# Patient Record
Sex: FEMALE | Race: WHITE | Employment: PART TIME | URBAN - METROPOLITAN AREA
[De-identification: names, ages, dates, MRNs, and addresses within clinical notes are randomized per-mention and may not be internally consistent; named-entity substitution may affect disease eponyms.]

---

## 2017-03-08 ENCOUNTER — OFFICE VISIT (OUTPATIENT)
Dept: PULMONOLOGY | Age: 67
End: 2017-03-08

## 2017-03-08 VITALS
WEIGHT: 171 LBS | HEART RATE: 70 BPM | SYSTOLIC BLOOD PRESSURE: 108 MMHG | OXYGEN SATURATION: 95 % | DIASTOLIC BLOOD PRESSURE: 67 MMHG | BODY MASS INDEX: 29.35 KG/M2

## 2017-03-08 DIAGNOSIS — J98.4 RESTRICTIVE AIRWAY DISEASE: ICD-10-CM

## 2017-03-08 DIAGNOSIS — J44.9 COPD, MILD (HCC): Primary | ICD-10-CM

## 2017-03-08 DIAGNOSIS — R07.89 OTHER CHEST PAIN: ICD-10-CM

## 2017-03-08 DIAGNOSIS — R06.02 SOB (SHORTNESS OF BREATH): ICD-10-CM

## 2017-03-08 PROCEDURE — G8926 SPIRO NO PERF OR DOC: HCPCS | Performed by: INTERNAL MEDICINE

## 2017-03-08 PROCEDURE — 4040F PNEUMOC VAC/ADMIN/RCVD: CPT | Performed by: INTERNAL MEDICINE

## 2017-03-08 PROCEDURE — G8420 CALC BMI NORM PARAMETERS: HCPCS | Performed by: INTERNAL MEDICINE

## 2017-03-08 PROCEDURE — G8484 FLU IMMUNIZE NO ADMIN: HCPCS | Performed by: INTERNAL MEDICINE

## 2017-03-08 PROCEDURE — 1090F PRES/ABSN URINE INCON ASSESS: CPT | Performed by: INTERNAL MEDICINE

## 2017-03-08 PROCEDURE — G8427 DOCREV CUR MEDS BY ELIG CLIN: HCPCS | Performed by: INTERNAL MEDICINE

## 2017-03-08 PROCEDURE — G8400 PT W/DXA NO RESULTS DOC: HCPCS | Performed by: INTERNAL MEDICINE

## 2017-03-08 PROCEDURE — 1123F ACP DISCUSS/DSCN MKR DOCD: CPT | Performed by: INTERNAL MEDICINE

## 2017-03-08 PROCEDURE — 3023F SPIROM DOC REV: CPT | Performed by: INTERNAL MEDICINE

## 2017-03-08 PROCEDURE — 3014F SCREEN MAMMO DOC REV: CPT | Performed by: INTERNAL MEDICINE

## 2017-03-08 PROCEDURE — 99214 OFFICE O/P EST MOD 30 MIN: CPT | Performed by: INTERNAL MEDICINE

## 2017-03-08 PROCEDURE — 1036F TOBACCO NON-USER: CPT | Performed by: INTERNAL MEDICINE

## 2017-03-08 PROCEDURE — 3017F COLORECTAL CA SCREEN DOC REV: CPT | Performed by: INTERNAL MEDICINE

## 2017-03-08 RX ORDER — ATORVASTATIN CALCIUM 20 MG/1
1 TABLET, FILM COATED ORAL DAILY
COMMUNITY
Start: 2017-03-01

## 2017-03-08 RX ORDER — ALBUTEROL SULFATE 90 UG/1
2 AEROSOL, METERED RESPIRATORY (INHALATION) EVERY 6 HOURS PRN
Qty: 1 INHALER | Refills: 11 | Status: SHIPPED | OUTPATIENT
Start: 2017-03-08 | End: 2017-10-11 | Stop reason: SDUPTHER

## 2017-03-14 ENCOUNTER — HOSPITAL ENCOUNTER (OUTPATIENT)
Dept: PULMONOLOGY | Age: 67
Discharge: HOME | End: 2017-03-14
Attending: INTERNAL MEDICINE | Admitting: INTERNAL MEDICINE

## 2017-03-14 VITALS — OXYGEN SATURATION: 97 % | HEART RATE: 61 BPM

## 2017-03-14 DIAGNOSIS — J44.9 CHRONIC OBSTRUCTIVE PULMONARY DISEASE (HCC): ICD-10-CM

## 2017-03-14 RX ORDER — ALBUTEROL SULFATE 90 UG/1
4 AEROSOL, METERED RESPIRATORY (INHALATION) ONCE
Status: COMPLETED | OUTPATIENT
Start: 2017-03-14 | End: 2017-03-14

## 2017-03-14 RX ADMIN — ALBUTEROL SULFATE 4 PUFF: 90 AEROSOL, METERED RESPIRATORY (INHALATION) at 17:24

## 2017-03-20 ENCOUNTER — TELEPHONE (OUTPATIENT)
Dept: PULMONOLOGY | Age: 67
End: 2017-03-20

## 2017-04-10 ENCOUNTER — OFFICE VISIT (OUTPATIENT)
Dept: PULMONOLOGY | Age: 67
End: 2017-04-10

## 2017-04-10 VITALS — SYSTOLIC BLOOD PRESSURE: 117 MMHG | DIASTOLIC BLOOD PRESSURE: 67 MMHG | HEART RATE: 78 BPM

## 2017-04-10 DIAGNOSIS — I10 ESSENTIAL HYPERTENSION: ICD-10-CM

## 2017-04-10 DIAGNOSIS — G47.33 OSA (OBSTRUCTIVE SLEEP APNEA): ICD-10-CM

## 2017-04-10 DIAGNOSIS — R06.02 SOB (SHORTNESS OF BREATH): Primary | ICD-10-CM

## 2017-04-10 DIAGNOSIS — J44.9 COPD, MODERATE (HCC): ICD-10-CM

## 2017-04-10 PROCEDURE — G8926 SPIRO NO PERF OR DOC: HCPCS | Performed by: INTERNAL MEDICINE

## 2017-04-10 PROCEDURE — 1036F TOBACCO NON-USER: CPT | Performed by: INTERNAL MEDICINE

## 2017-04-10 PROCEDURE — 3017F COLORECTAL CA SCREEN DOC REV: CPT | Performed by: INTERNAL MEDICINE

## 2017-04-10 PROCEDURE — 1090F PRES/ABSN URINE INCON ASSESS: CPT | Performed by: INTERNAL MEDICINE

## 2017-04-10 PROCEDURE — 3023F SPIROM DOC REV: CPT | Performed by: INTERNAL MEDICINE

## 2017-04-10 PROCEDURE — 1123F ACP DISCUSS/DSCN MKR DOCD: CPT | Performed by: INTERNAL MEDICINE

## 2017-04-10 PROCEDURE — 4040F PNEUMOC VAC/ADMIN/RCVD: CPT | Performed by: INTERNAL MEDICINE

## 2017-04-10 PROCEDURE — G8400 PT W/DXA NO RESULTS DOC: HCPCS | Performed by: INTERNAL MEDICINE

## 2017-04-10 PROCEDURE — G8427 DOCREV CUR MEDS BY ELIG CLIN: HCPCS | Performed by: INTERNAL MEDICINE

## 2017-04-10 PROCEDURE — G8420 CALC BMI NORM PARAMETERS: HCPCS | Performed by: INTERNAL MEDICINE

## 2017-04-10 PROCEDURE — 99214 OFFICE O/P EST MOD 30 MIN: CPT | Performed by: INTERNAL MEDICINE

## 2017-04-10 PROCEDURE — 3014F SCREEN MAMMO DOC REV: CPT | Performed by: INTERNAL MEDICINE

## 2017-05-02 ENCOUNTER — HOSPITAL ENCOUNTER (OUTPATIENT)
Dept: CARDIAC REHAB | Age: 67
Discharge: HOME | End: 2017-05-31
Attending: FAMILY MEDICINE | Admitting: INTERNAL MEDICINE

## 2017-10-11 ENCOUNTER — OFFICE VISIT (OUTPATIENT)
Dept: PULMONOLOGY | Age: 67
End: 2017-10-11

## 2017-10-11 VITALS
BODY MASS INDEX: 29.52 KG/M2 | WEIGHT: 172 LBS | DIASTOLIC BLOOD PRESSURE: 77 MMHG | HEART RATE: 80 BPM | SYSTOLIC BLOOD PRESSURE: 131 MMHG

## 2017-10-11 DIAGNOSIS — R06.02 SOB (SHORTNESS OF BREATH): ICD-10-CM

## 2017-10-11 DIAGNOSIS — I10 ESSENTIAL HYPERTENSION: ICD-10-CM

## 2017-10-11 DIAGNOSIS — J44.9 COPD, MODERATE (HCC): Primary | ICD-10-CM

## 2017-10-11 PROCEDURE — 1090F PRES/ABSN URINE INCON ASSESS: CPT | Performed by: INTERNAL MEDICINE

## 2017-10-11 PROCEDURE — 1036F TOBACCO NON-USER: CPT | Performed by: INTERNAL MEDICINE

## 2017-10-11 PROCEDURE — G8484 FLU IMMUNIZE NO ADMIN: HCPCS | Performed by: INTERNAL MEDICINE

## 2017-10-11 PROCEDURE — G8926 SPIRO NO PERF OR DOC: HCPCS | Performed by: INTERNAL MEDICINE

## 2017-10-11 PROCEDURE — G8419 CALC BMI OUT NRM PARAM NOF/U: HCPCS | Performed by: INTERNAL MEDICINE

## 2017-10-11 PROCEDURE — 3017F COLORECTAL CA SCREEN DOC REV: CPT | Performed by: INTERNAL MEDICINE

## 2017-10-11 PROCEDURE — G8427 DOCREV CUR MEDS BY ELIG CLIN: HCPCS | Performed by: INTERNAL MEDICINE

## 2017-10-11 PROCEDURE — 4040F PNEUMOC VAC/ADMIN/RCVD: CPT | Performed by: INTERNAL MEDICINE

## 2017-10-11 PROCEDURE — 1123F ACP DISCUSS/DSCN MKR DOCD: CPT | Performed by: INTERNAL MEDICINE

## 2017-10-11 PROCEDURE — G8400 PT W/DXA NO RESULTS DOC: HCPCS | Performed by: INTERNAL MEDICINE

## 2017-10-11 PROCEDURE — 3023F SPIROM DOC REV: CPT | Performed by: INTERNAL MEDICINE

## 2017-10-11 PROCEDURE — 99213 OFFICE O/P EST LOW 20 MIN: CPT | Performed by: INTERNAL MEDICINE

## 2017-10-11 PROCEDURE — 3014F SCREEN MAMMO DOC REV: CPT | Performed by: INTERNAL MEDICINE

## 2017-10-11 RX ORDER — ALBUTEROL SULFATE 90 UG/1
2 AEROSOL, METERED RESPIRATORY (INHALATION) EVERY 6 HOURS PRN
Qty: 1 INHALER | Refills: 11 | Status: SHIPPED | OUTPATIENT
Start: 2017-10-11 | End: 2018-10-16 | Stop reason: SDUPTHER

## 2017-10-11 NOTE — PROGRESS NOTES
[facility]  Progress Note  Amanda Huynh MD       Olivier Doherty   YOB: 1950    Date of Visit:  10/11/2017    Assessment/Plan:  1. COPD, moderate ([address]  PFT 2107:  INTERPRETATION: Spirometry reveals decreased FVC at 2.18 liters, which is 69%  of predicted. FEV1 is decreased at 1.51 liters, which is 62% of predicted. FEV1/FVC ratio is reduced. There is significant improvement in FEV1 after  inhaled bronchodilators. Lung volumes reveal normal total lung capacity and  vital capacity. Residual volume is at the upper limits of normal. Diffusion capacity is normal. In comparison to a study from September 2012, FVC has decreased by 13%, FEV1 has decreased by 20% and residual volume has increased  by 45%.     IMPRESSION: Moderate obstructive lung disease. In comparison to previous  study, air flow has worsened and there is a trend towards air trapping. Sarah Doan   Rin  Albuterol    Pulmonary rehab helped. She is now exercising 5 days a week at [place]    2. SOB (shortness of breath)  Exercise has helped    3. Essential hypertension  BP Is ok    4. Immunization  PCV 13/23 and Flu shot are UTD      FOLLOW UP: 6 months      Chief Complaint   Patient presents with    6 Month Follow-Up     did Pulmonary Rehab from [place] to August and it did make her feel better. Still has her spells where she cannot breathe but feels she is doing ok       HPI  The patient presents with a chief complaint of shortness of breath. This has been better for her since she completed pulmonary rehab. No Chest pain, Nausea or vomiting reported      Review of Systems  As documented in HPI     Physical Exam:  Well developed, well nourished  Alert and oriented  Sclera is clear  No cervical adenopathy  No JVD. Chest examination is clear. Cardiac examination reveals regular rate and rhythm without murmur, gallop or rub. The abdomen is soft, nontender and nondistended.    There is no clubbing, cyanosis or edema of the extremities. There is no obvious skin rash. No focal neuro deficicts  Normal mood and affect    Allergies   Allergen Reactions    Norvasc [Amlodipine Besylate]      Prior to Visit Medications    Medication Sig Taking? Authorizing Provider   atorvastatin (LIPITOR) 20 MG tablet Take 1 tablet by mouth daily  Historical Provider, MD   metoprolol tartrate (LOPRESSOR) 25 MG tablet Take 1 tablet by mouth 2 times daily  Historical Provider, MD   aclidinium (TUDORZA PRESSAIR) 400 MCG/ACT AEPB inhaler USE 1 INHALATION INTO THE LUNGS 2 TIMES DAILY. Bbaar Harrell MD   mometasone-formoterol [facility]) 200-5 MCG/ACT inhaler 2 puffs twice a day (12 hrs apart)  Babar Harrell MD   albuterol sulfate  (90 BASE) MCG/ACT inhaler Inhale 2 puffs into the lungs every 6 hours as needed for Wheezing  Babar Harrell MD   DULERA 200-5 MCG/ACT inhaler INHALE 2 PUFFS INTO THE LUNGS 2 TIMES DAILY. Babar Harrell MD   Calcium Carbonate-Vitamin D (CALCIUM + D PO) Take  by mouth. Historical Provider, MD   Naproxen Sodium (ALEVE) 220 MG CAPS Take 1 tablet by mouth daily. Historical Provider, MD   KLOR-CON M20 20 MEQ tablet Take 1 tablet by mouth 2 times daily. Historical Provider, MD   hydrochlorothiazide (HYDRODIURIL) 25 MG tablet Take 1 tablet by mouth daily. Historical Provider, MD   LEXAPRO 20 MG tablet Take 1 tablet by mouth daily. Historical Provider, MD   doxazosin (CARDURA) 8 MG tablet Take 1 tablet by mouth daily. Historical Provider, MD       Vitals:    10/11/17 1556   BP: 131/77   Pulse: 80   Weight: 172 lb (78 kg)     Body mass index is 29.52 kg/m².      Wt Readings from Last 3 Encounters:   10/11/17 172 lb (78 kg)   03/08/17 171 lb (77.6 kg)   06/15/15 164 lb (74.4 kg)     BP Readings from Last 3 Encounters:   10/11/17 131/77   04/10/17 117/67   03/08/17 108/67        History   Smoking Status    Never Smoker   Smokeless Tobacco    Never Used

## 2018-04-09 ENCOUNTER — OFFICE VISIT (OUTPATIENT)
Dept: PULMONOLOGY | Age: 68
End: 2018-04-09

## 2018-04-09 VITALS
DIASTOLIC BLOOD PRESSURE: 79 MMHG | SYSTOLIC BLOOD PRESSURE: 128 MMHG | HEART RATE: 98 BPM | WEIGHT: 167 LBS | BODY MASS INDEX: 28.67 KG/M2

## 2018-04-09 DIAGNOSIS — I10 ESSENTIAL HYPERTENSION: ICD-10-CM

## 2018-04-09 DIAGNOSIS — R06.02 SOB (SHORTNESS OF BREATH): ICD-10-CM

## 2018-04-09 DIAGNOSIS — J44.9 COPD, MODERATE (HCC): Primary | ICD-10-CM

## 2018-04-09 PROCEDURE — G8926 SPIRO NO PERF OR DOC: HCPCS | Performed by: INTERNAL MEDICINE

## 2018-04-09 PROCEDURE — 1123F ACP DISCUSS/DSCN MKR DOCD: CPT | Performed by: INTERNAL MEDICINE

## 2018-04-09 PROCEDURE — 4040F PNEUMOC VAC/ADMIN/RCVD: CPT | Performed by: INTERNAL MEDICINE

## 2018-04-09 PROCEDURE — G8419 CALC BMI OUT NRM PARAM NOF/U: HCPCS | Performed by: INTERNAL MEDICINE

## 2018-04-09 PROCEDURE — 3014F SCREEN MAMMO DOC REV: CPT | Performed by: INTERNAL MEDICINE

## 2018-04-09 PROCEDURE — 3023F SPIROM DOC REV: CPT | Performed by: INTERNAL MEDICINE

## 2018-04-09 PROCEDURE — 1036F TOBACCO NON-USER: CPT | Performed by: INTERNAL MEDICINE

## 2018-04-09 PROCEDURE — 1090F PRES/ABSN URINE INCON ASSESS: CPT | Performed by: INTERNAL MEDICINE

## 2018-04-09 PROCEDURE — G8427 DOCREV CUR MEDS BY ELIG CLIN: HCPCS | Performed by: INTERNAL MEDICINE

## 2018-04-09 PROCEDURE — 99214 OFFICE O/P EST MOD 30 MIN: CPT | Performed by: INTERNAL MEDICINE

## 2018-04-09 PROCEDURE — G8400 PT W/DXA NO RESULTS DOC: HCPCS | Performed by: INTERNAL MEDICINE

## 2018-04-09 PROCEDURE — 3017F COLORECTAL CA SCREEN DOC REV: CPT | Performed by: INTERNAL MEDICINE

## 2018-04-09 RX ORDER — FLUTICASONE FUROATE AND VILANTEROL 100; 25 UG/1; UG/1
1 POWDER RESPIRATORY (INHALATION) DAILY
Qty: 1 EACH | Refills: 11 | Status: SHIPPED | OUTPATIENT
Start: 2018-04-09 | End: 2018-10-16

## 2018-08-29 ENCOUNTER — OFFICE VISIT (OUTPATIENT)
Dept: PULMONOLOGY | Age: 68
End: 2018-08-29

## 2018-08-29 VITALS
BODY MASS INDEX: 28.67 KG/M2 | WEIGHT: 167 LBS | OXYGEN SATURATION: 94 % | SYSTOLIC BLOOD PRESSURE: 117 MMHG | DIASTOLIC BLOOD PRESSURE: 78 MMHG | HEART RATE: 95 BPM

## 2018-08-29 DIAGNOSIS — J44.9 MODERATE COPD (CHRONIC OBSTRUCTIVE PULMONARY DISEASE) (HCC): Primary | ICD-10-CM

## 2018-08-29 DIAGNOSIS — Z01.818 PRE-OP EXAM: ICD-10-CM

## 2018-08-29 PROCEDURE — 1090F PRES/ABSN URINE INCON ASSESS: CPT | Performed by: NURSE PRACTITIONER

## 2018-08-29 PROCEDURE — 4040F PNEUMOC VAC/ADMIN/RCVD: CPT | Performed by: NURSE PRACTITIONER

## 2018-08-29 PROCEDURE — 3023F SPIROM DOC REV: CPT | Performed by: NURSE PRACTITIONER

## 2018-08-29 PROCEDURE — 1123F ACP DISCUSS/DSCN MKR DOCD: CPT | Performed by: NURSE PRACTITIONER

## 2018-08-29 PROCEDURE — G8427 DOCREV CUR MEDS BY ELIG CLIN: HCPCS | Performed by: NURSE PRACTITIONER

## 2018-08-29 PROCEDURE — 1101F PT FALLS ASSESS-DOCD LE1/YR: CPT | Performed by: NURSE PRACTITIONER

## 2018-08-29 PROCEDURE — 99213 OFFICE O/P EST LOW 20 MIN: CPT | Performed by: NURSE PRACTITIONER

## 2018-08-29 PROCEDURE — G8419 CALC BMI OUT NRM PARAM NOF/U: HCPCS | Performed by: NURSE PRACTITIONER

## 2018-08-29 PROCEDURE — 3017F COLORECTAL CA SCREEN DOC REV: CPT | Performed by: NURSE PRACTITIONER

## 2018-08-29 PROCEDURE — 1036F TOBACCO NON-USER: CPT | Performed by: NURSE PRACTITIONER

## 2018-08-29 PROCEDURE — G8926 SPIRO NO PERF OR DOC: HCPCS | Performed by: NURSE PRACTITIONER

## 2018-08-29 PROCEDURE — G8400 PT W/DXA NO RESULTS DOC: HCPCS | Performed by: NURSE PRACTITIONER

## 2018-08-29 ASSESSMENT — ENCOUNTER SYMPTOMS
ABDOMINAL PAIN: 0
COLOR CHANGE: 0
SHORTNESS OF BREATH: 1
COUGH: 0
VOMITING: 0

## 2018-08-29 NOTE — PROGRESS NOTES
tablet Take 1 tablet by mouth 2 times daily      DULERA 200-5 MCG/ACT inhaler INHALE 2 PUFFS INTO THE LUNGS 2 TIMES DAILY. 3 Inhaler 3    Calcium Carbonate-Vitamin D (CALCIUM + D PO) Take  by mouth.  Naproxen Sodium (ALEVE) 220 MG CAPS Take 1 tablet by mouth daily.  KLOR-CON M20 20 MEQ tablet Take 1 tablet by mouth 2 times daily.  hydrochlorothiazide (HYDRODIURIL) 25 MG tablet Take 1 tablet by mouth daily.  LEXAPRO 20 MG tablet Take 1 tablet by mouth daily.  doxazosin (CARDURA) 8 MG tablet Take 1 tablet by mouth daily. No current facility-administered medications on file prior to visit. Review of Systems   Constitutional: Negative for chills and fever. HENT: Negative for congestion and postnasal drip. Respiratory: Positive for shortness of breath. Negative for cough. Cardiovascular: Negative for chest pain and leg swelling. Gastrointestinal: Negative for abdominal pain and vomiting. Musculoskeletal: Positive for arthralgias. Negative for myalgias. Skin: Negative for color change and pallor. Hematological: Negative for adenopathy. Psychiatric/Behavioral: Negative for agitation and confusion. Objective:       VITALS:  /78   Pulse 95   Wt 167 lb (75.8 kg)   SpO2 94%   BMI 28.67 kg/m²  on RA    Physical Exam   Constitutional: She is oriented to person, place, and time. She appears well-developed and well-nourished. No distress. HENT:   Head: Normocephalic. Mouth/Throat: No oropharyngeal exudate. Eyes: Pupils are equal, round, and reactive to light. Conjunctivae are normal. Right eye exhibits no discharge. Left eye exhibits no discharge. Neck: Normal range of motion. Neck supple. No tracheal deviation present. Cardiovascular: Normal rate, regular rhythm and intact distal pulses. Exam reveals no friction rub. Pulmonary/Chest: Effort normal. No accessory muscle usage or stridor. No tachypnea. No respiratory distress. She has no wheezes. from a pulmonary perspective  - Moderate OLD by PFT from last year  - Continue [facility] / V[facility]ctor Dan  - We discussed more intentional use of TOM post operatively   - Encouraged CDBE post op    2. Pre-op exam  - She tolerated past hip surgery but that was more than 20 years ago  - Her breathing has been stable  - There risks associated with anesthesia related to her moderate COPD were discussed  - These risks include prolonged mechanical ventilation, respiratory failure and infection   - I think she will tolerate anesthesia well but the patient has reservations about general anesthesia and would like to talk with the anesthesiologist about a regional block with sedation   - The patient may proceed with planned procedure with mild to moderate risk of perioperative complications     Return in about 4 weeks (around 9/26/2018).      Ginette Staff MSN APRN-ACNP CCRN

## 2018-10-16 ENCOUNTER — OFFICE VISIT (OUTPATIENT)
Dept: PULMONOLOGY | Age: 68
End: 2018-10-16
Payer: MEDICARE

## 2018-10-16 VITALS — OXYGEN SATURATION: 96 % | HEART RATE: 110 BPM | SYSTOLIC BLOOD PRESSURE: 118 MMHG | DIASTOLIC BLOOD PRESSURE: 74 MMHG

## 2018-10-16 DIAGNOSIS — J44.9 COPD, MODERATE (HCC): Primary | ICD-10-CM

## 2018-10-16 DIAGNOSIS — Z23 NEED FOR INFLUENZA VACCINATION: ICD-10-CM

## 2018-10-16 DIAGNOSIS — R06.02 SOB (SHORTNESS OF BREATH): ICD-10-CM

## 2018-10-16 DIAGNOSIS — I10 ESSENTIAL HYPERTENSION: ICD-10-CM

## 2018-10-16 PROCEDURE — 90662 IIV NO PRSV INCREASED AG IM: CPT | Performed by: INTERNAL MEDICINE

## 2018-10-16 PROCEDURE — G8400 PT W/DXA NO RESULTS DOC: HCPCS | Performed by: INTERNAL MEDICINE

## 2018-10-16 PROCEDURE — 99213 OFFICE O/P EST LOW 20 MIN: CPT | Performed by: INTERNAL MEDICINE

## 2018-10-16 PROCEDURE — 3017F COLORECTAL CA SCREEN DOC REV: CPT | Performed by: INTERNAL MEDICINE

## 2018-10-16 PROCEDURE — G0008 ADMIN INFLUENZA VIRUS VAC: HCPCS | Performed by: INTERNAL MEDICINE

## 2018-10-16 PROCEDURE — G8482 FLU IMMUNIZE ORDER/ADMIN: HCPCS | Performed by: INTERNAL MEDICINE

## 2018-10-16 PROCEDURE — G8419 CALC BMI OUT NRM PARAM NOF/U: HCPCS | Performed by: INTERNAL MEDICINE

## 2018-10-16 PROCEDURE — G8427 DOCREV CUR MEDS BY ELIG CLIN: HCPCS | Performed by: INTERNAL MEDICINE

## 2018-10-16 PROCEDURE — 3023F SPIROM DOC REV: CPT | Performed by: INTERNAL MEDICINE

## 2018-10-16 PROCEDURE — G8926 SPIRO NO PERF OR DOC: HCPCS | Performed by: INTERNAL MEDICINE

## 2018-10-16 PROCEDURE — 1090F PRES/ABSN URINE INCON ASSESS: CPT | Performed by: INTERNAL MEDICINE

## 2018-10-16 PROCEDURE — 1101F PT FALLS ASSESS-DOCD LE1/YR: CPT | Performed by: INTERNAL MEDICINE

## 2018-10-16 PROCEDURE — 1123F ACP DISCUSS/DSCN MKR DOCD: CPT | Performed by: INTERNAL MEDICINE

## 2018-10-16 PROCEDURE — 1036F TOBACCO NON-USER: CPT | Performed by: INTERNAL MEDICINE

## 2018-10-16 PROCEDURE — 4040F PNEUMOC VAC/ADMIN/RCVD: CPT | Performed by: INTERNAL MEDICINE

## 2018-10-16 RX ORDER — ALBUTEROL SULFATE 90 UG/1
2 AEROSOL, METERED RESPIRATORY (INHALATION) EVERY 6 HOURS PRN
Qty: 1 INHALER | Refills: 11 | Status: SHIPPED | OUTPATIENT
Start: 2018-10-16 | End: 2019-10-25 | Stop reason: SDUPTHER

## 2018-10-31 ENCOUNTER — TELEPHONE (OUTPATIENT)
Dept: PULMONOLOGY | Age: 68
End: 2018-10-31

## 2018-11-02 NOTE — TELEPHONE ENCOUNTER
Pt informed via vm and asked for her to call us back with that Dr's phone # because I cannot find contact info

## 2018-11-05 ENCOUNTER — TELEPHONE (OUTPATIENT)
Dept: PULMONOLOGY | Age: 68
End: 2018-11-05

## 2018-11-15 PROBLEM — Z23 NEED FOR INFLUENZA VACCINATION: Status: RESOLVED | Noted: 2018-10-16 | Resolved: 2018-11-15

## 2019-01-02 ENCOUNTER — TELEPHONE (OUTPATIENT)
Dept: PULMONOLOGY | Age: 69
End: 2019-01-02

## 2019-04-17 ENCOUNTER — OFFICE VISIT (OUTPATIENT)
Dept: PULMONOLOGY | Age: 69
End: 2019-04-17
Payer: MEDICARE

## 2019-04-17 VITALS
DIASTOLIC BLOOD PRESSURE: 85 MMHG | HEART RATE: 67 BPM | WEIGHT: 163 LBS | OXYGEN SATURATION: 96 % | SYSTOLIC BLOOD PRESSURE: 127 MMHG | BODY MASS INDEX: 27.98 KG/M2

## 2019-04-17 DIAGNOSIS — J44.9 COPD, MODERATE (HCC): Primary | ICD-10-CM

## 2019-04-17 DIAGNOSIS — R06.02 SOB (SHORTNESS OF BREATH): ICD-10-CM

## 2019-04-17 DIAGNOSIS — I10 ESSENTIAL HYPERTENSION: ICD-10-CM

## 2019-04-17 PROCEDURE — G8427 DOCREV CUR MEDS BY ELIG CLIN: HCPCS | Performed by: INTERNAL MEDICINE

## 2019-04-17 PROCEDURE — 1123F ACP DISCUSS/DSCN MKR DOCD: CPT | Performed by: INTERNAL MEDICINE

## 2019-04-17 PROCEDURE — 1036F TOBACCO NON-USER: CPT | Performed by: INTERNAL MEDICINE

## 2019-04-17 PROCEDURE — 1090F PRES/ABSN URINE INCON ASSESS: CPT | Performed by: INTERNAL MEDICINE

## 2019-04-17 PROCEDURE — G8400 PT W/DXA NO RESULTS DOC: HCPCS | Performed by: INTERNAL MEDICINE

## 2019-04-17 PROCEDURE — 99213 OFFICE O/P EST LOW 20 MIN: CPT | Performed by: INTERNAL MEDICINE

## 2019-04-17 PROCEDURE — G8926 SPIRO NO PERF OR DOC: HCPCS | Performed by: INTERNAL MEDICINE

## 2019-04-17 PROCEDURE — G8419 CALC BMI OUT NRM PARAM NOF/U: HCPCS | Performed by: INTERNAL MEDICINE

## 2019-04-17 PROCEDURE — 3023F SPIROM DOC REV: CPT | Performed by: INTERNAL MEDICINE

## 2019-04-17 PROCEDURE — 4040F PNEUMOC VAC/ADMIN/RCVD: CPT | Performed by: INTERNAL MEDICINE

## 2019-04-17 PROCEDURE — 3017F COLORECTAL CA SCREEN DOC REV: CPT | Performed by: INTERNAL MEDICINE

## 2019-04-17 NOTE — PROGRESS NOTES
[facility]  Progress Note  MD Naima Zepedakatharine Reid   YOB: 1950    Date of Visit:  4/17/2019    Assessment/Plan:  1. COPD, moderate ([address]  PFT 2017:  INTERPRETATION: Spirometry reveals decreased FVC at 2.18 liters, which is 69%  of predicted. FEV1 is decreased at 1.51 liters, which is 62% of predicted. FEV1/FVC ratio is reduced. There is significant improvement in FEV1 after  inhaled bronchodilators. Lung volumes reveal normal total lung capacity and  vital capacity. Residual volume is at the upper limits of normal. Diffusion capacity is normal. In comparison to a study from September 2012, FVC has decreased by 13%, FEV1 has decreased by 20% and residual volume has increased  by 45%.     IMPRESSION: Moderate obstructive lung disease. In comparison to previous  study, air flow has worsened and there is a trend towards air trapping. Chelle Cheese  Tudorza  Albuterol    Doing some exercise. 2. SOB (shortness of breath)  Exercise has helped    3. Essential hypertension  BP Is ok    4. Immunization  PCV 13/23 and Flu shot are UTD      FOLLOW UP: 6 months      Chief Complaint   Patient presents with    Shortness of Breath     6 month       HPI  The patient presents with a chief complaint of shortness of breath. She had some bronchitis and cough over the winter. She is back to baseline now. No Chest pain, Nausea or vomiting reported      Review of Systems  As documented in HPI     Physical Exam:  Well developed, well nourished  Alert and oriented  Sclera is clear  No cervical adenopathy  No JVD. Chest examination is clear. Cardiac examination reveals regular rate and rhythm without murmur, gallop or rub. The abdomen is soft, nontender and nondistended. There is no clubbing, cyanosis or edema of the extremities. There is no obvious skin rash.   No focal neuro deficicts  Normal mood and affect    Allergies   Allergen Reactions    Norvasc [Amlodipine Besylate]      Prior to Visit Medications    Medication Sig Taking? Authorizing Provider   aclidinium (TUDORZA PRESSAIR) 400 MCG/ACT AEPB inhaler USE 1 INHALATION INTO THE LUNGS 2 TIMES DAILY. Rosana Coker MD   mometasone-formoterol [facility]) 200-5 MCG/ACT inhaler 2 puffs twice a day (12 hrs apart)  Rosana Coker MD   albuterol sulfate  (90 Base) MCG/ACT inhaler Inhale 2 puffs into the lungs every 6 hours as needed for Wheezing  Rosana Coker MD   atorvastatin (LIPITOR) 20 MG tablet Take 1 tablet by mouth daily  Historical Provider, MD   metoprolol tartrate (LOPRESSOR) 25 MG tablet Take 1 tablet by mouth 2 times daily  Historical Provider, MD   DULERA 200-5 MCG/ACT inhaler INHALE 2 PUFFS INTO THE LUNGS 2 TIMES DAILY. Rosana Coker MD   Calcium Carbonate-Vitamin D (CALCIUM + D PO) Take  by mouth. Historical Provider, MD   Naproxen Sodium (ALEVE) 220 MG CAPS Take 1 tablet by mouth daily. Historical Provider, MD   KLOR-CON M20 20 MEQ tablet Take 1 tablet by mouth 2 times daily. Historical Provider, MD   hydrochlorothiazide (HYDRODIURIL) 25 MG tablet Take 1 tablet by mouth daily. Historical Provider, MD   LEXAPRO 20 MG tablet Take 1 tablet by mouth daily. Historical Provider, MD   doxazosin (CARDURA) 8 MG tablet Take 1 tablet by mouth daily. Historical Provider, MD       Vitals:    04/17/19 1441   BP: 127/85   Pulse: 67   SpO2: 96%   Weight: 163 lb (73.9 kg)     Body mass index is 27.98 kg/m².      Wt Readings from Last 3 Encounters:   04/17/19 163 lb (73.9 kg)   08/29/18 167 lb (75.8 kg)   04/09/18 167 lb (75.8 kg)     BP Readings from Last 3 Encounters:   04/17/19 127/85   10/16/18 118/74   08/29/18 117/78        Social History     Tobacco Use   Smoking Status Never Smoker   Smokeless Tobacco Never Used

## 2019-10-25 ENCOUNTER — OFFICE VISIT (OUTPATIENT)
Dept: PULMONOLOGY | Age: 69
End: 2019-10-25
Payer: MEDICARE

## 2019-10-25 VITALS — OXYGEN SATURATION: 97 % | DIASTOLIC BLOOD PRESSURE: 74 MMHG | SYSTOLIC BLOOD PRESSURE: 126 MMHG | HEART RATE: 85 BPM

## 2019-10-25 DIAGNOSIS — I10 ESSENTIAL HYPERTENSION: ICD-10-CM

## 2019-10-25 DIAGNOSIS — J44.9 COPD, MODERATE (HCC): Primary | ICD-10-CM

## 2019-10-25 DIAGNOSIS — R06.02 SOB (SHORTNESS OF BREATH): ICD-10-CM

## 2019-10-25 PROCEDURE — G8400 PT W/DXA NO RESULTS DOC: HCPCS | Performed by: INTERNAL MEDICINE

## 2019-10-25 PROCEDURE — 1123F ACP DISCUSS/DSCN MKR DOCD: CPT | Performed by: INTERNAL MEDICINE

## 2019-10-25 PROCEDURE — G8427 DOCREV CUR MEDS BY ELIG CLIN: HCPCS | Performed by: INTERNAL MEDICINE

## 2019-10-25 PROCEDURE — G8484 FLU IMMUNIZE NO ADMIN: HCPCS | Performed by: INTERNAL MEDICINE

## 2019-10-25 PROCEDURE — G8419 CALC BMI OUT NRM PARAM NOF/U: HCPCS | Performed by: INTERNAL MEDICINE

## 2019-10-25 PROCEDURE — 4040F PNEUMOC VAC/ADMIN/RCVD: CPT | Performed by: INTERNAL MEDICINE

## 2019-10-25 PROCEDURE — 3023F SPIROM DOC REV: CPT | Performed by: INTERNAL MEDICINE

## 2019-10-25 PROCEDURE — G8926 SPIRO NO PERF OR DOC: HCPCS | Performed by: INTERNAL MEDICINE

## 2019-10-25 PROCEDURE — 1090F PRES/ABSN URINE INCON ASSESS: CPT | Performed by: INTERNAL MEDICINE

## 2019-10-25 PROCEDURE — 1036F TOBACCO NON-USER: CPT | Performed by: INTERNAL MEDICINE

## 2019-10-25 PROCEDURE — 3017F COLORECTAL CA SCREEN DOC REV: CPT | Performed by: INTERNAL MEDICINE

## 2019-10-25 PROCEDURE — 99213 OFFICE O/P EST LOW 20 MIN: CPT | Performed by: INTERNAL MEDICINE

## 2019-10-25 RX ORDER — ALBUTEROL SULFATE 90 UG/1
2 AEROSOL, METERED RESPIRATORY (INHALATION) EVERY 6 HOURS PRN
Qty: 1 INHALER | Refills: 11 | Status: SHIPPED | OUTPATIENT
Start: 2019-10-25 | End: 2020-10-24

## 2020-04-24 ENCOUNTER — VIRTUAL VISIT (OUTPATIENT)
Dept: PULMONOLOGY | Age: 70
End: 2020-04-24
Payer: MEDICARE

## 2020-04-24 PROCEDURE — 1090F PRES/ABSN URINE INCON ASSESS: CPT | Performed by: INTERNAL MEDICINE

## 2020-04-24 PROCEDURE — G8400 PT W/DXA NO RESULTS DOC: HCPCS | Performed by: INTERNAL MEDICINE

## 2020-04-24 PROCEDURE — 4040F PNEUMOC VAC/ADMIN/RCVD: CPT | Performed by: INTERNAL MEDICINE

## 2020-04-24 PROCEDURE — 3017F COLORECTAL CA SCREEN DOC REV: CPT | Performed by: INTERNAL MEDICINE

## 2020-04-24 PROCEDURE — 99213 OFFICE O/P EST LOW 20 MIN: CPT | Performed by: INTERNAL MEDICINE

## 2020-04-24 PROCEDURE — 1123F ACP DISCUSS/DSCN MKR DOCD: CPT | Performed by: INTERNAL MEDICINE

## 2020-04-24 PROCEDURE — G8427 DOCREV CUR MEDS BY ELIG CLIN: HCPCS | Performed by: INTERNAL MEDICINE

## 2020-04-24 RX ORDER — ACLIDINIUM BROMIDE 400 UG/1
1 POWDER, METERED RESPIRATORY (INHALATION) 2 TIMES DAILY
Qty: 1 EACH | Refills: 11 | Status: SHIPPED | OUTPATIENT
Start: 2020-04-24 | End: 2020-04-28

## 2020-04-24 NOTE — PROGRESS NOTES
patient presents with a chief complaint of moderate shortness of breath related to mild COPD of many years duration. He has mild associated cough. Exertion is a modifying factor. She had been doing water aerobics and benefited from that. However, during the coronavirus pandemic, this has been stopped. She is missing it. However, she does go for walks in the park near her [place] to try to keep her exercise habit intact. There is no complaint of chest pain, nausea or vomiting. She has not had any recent flareups        Review of Systems  As documented in HPI     Physical Exam:   Video visit    Allergies   Allergen Reactions    Norvasc [Amlodipine Besylate]      Prior to Visit Medications    Medication Sig Taking? Authorizing Provider   mometasone-formoterol (DULERA) 200-5 MCG/ACT inhaler Inhale 2 puffs into the lungs 2 times daily Yes Twyla Granado MD   aclidinium (TUDORZA PRESSAIR) 400 MCG/ACT AEPB inhaler Inhale 1 puff into the lungs 2 times daily Yes Twyla Granado MD   aclidinium (TUDORZA PRESSAIR) 400 MCG/ACT AEPB inhaler USE 1 INHALATION INTO THE LUNGS 2 TIMES DAILY. Twyla Granado MD   albuterol sulfate  (90 Base) MCG/ACT inhaler Inhale 2 puffs into the lungs every 6 hours as needed for Wheezing  Twyla Granado MD   mometasone-formoterol (DULERA) 200-5 MCG/ACT inhaler INHALE 2 PUFFS INTO THE LUNGS 2 TIMES DAILY. Twyla Granado MD   mometasone-formoterol [facility]) 200-5 MCG/ACT inhaler 2 puffs twice a day (12 hrs apart)  Twyla Granado MD   atorvastatin (LIPITOR) 20 MG tablet Take 1 tablet by mouth daily  Historical Provider, MD   metoprolol tartrate (LOPRESSOR) 25 MG tablet Take 1 tablet by mouth 2 times daily  Historical Provider, MD   Calcium Carbonate-Vitamin D (CALCIUM + D PO) Take  by mouth. Historical Provider, MD   Naproxen Sodium (ALEVE) 220 MG CAPS Take 1 tablet by mouth daily.   Historical Provider, MD   KLOR-CON M20 20 MEQ tablet Take 1 tablet by mouth 2 times daily.  Historical Provider, MD   hydrochlorothiazide (HYDRODIURIL) 25 MG tablet Take 1 tablet by mouth daily. Historical Provider, MD   LEXAPRO 20 MG tablet Take 1 tablet by mouth daily. Historical Provider, MD   doxazosin (CARDURA) 8 MG tablet Take 1 tablet by mouth daily. Historical Provider, MD       There were no vitals filed for this visit. There is no height or weight on file to calculate BMI.      Wt Readings from Last 3 Encounters:   04/17/19 163 lb (73.9 kg)   08/29/18 167 lb (75.8 kg)   04/09/18 167 lb (75.8 kg)     BP Readings from Last 3 Encounters:   10/25/19 126/74   04/17/19 127/85   10/16/18 118/74        Social History     Tobacco Use   Smoking Status Never Smoker   Smokeless Tobacco Never Used

## 2020-04-28 ENCOUNTER — TELEPHONE (OUTPATIENT)
Dept: PULMONOLOGY | Age: 70
End: 2020-04-28

## 2020-04-28 RX ORDER — UMECLIDINIUM 62.5 UG/1
1 AEROSOL, POWDER ORAL DAILY
Qty: 1 EACH | Refills: 6 | Status: SHIPPED | OUTPATIENT
Start: 2020-04-28 | End: 2021-05-05 | Stop reason: SDUPTHER

## 2020-10-27 ENCOUNTER — VIRTUAL VISIT (OUTPATIENT)
Dept: PULMONOLOGY | Age: 70
End: 2020-10-27
Payer: MEDICARE

## 2020-10-27 PROCEDURE — 99213 OFFICE O/P EST LOW 20 MIN: CPT | Performed by: INTERNAL MEDICINE

## 2020-10-27 PROCEDURE — 3017F COLORECTAL CA SCREEN DOC REV: CPT | Performed by: INTERNAL MEDICINE

## 2020-10-27 PROCEDURE — G8400 PT W/DXA NO RESULTS DOC: HCPCS | Performed by: INTERNAL MEDICINE

## 2020-10-27 PROCEDURE — 1123F ACP DISCUSS/DSCN MKR DOCD: CPT | Performed by: INTERNAL MEDICINE

## 2020-10-27 PROCEDURE — 1090F PRES/ABSN URINE INCON ASSESS: CPT | Performed by: INTERNAL MEDICINE

## 2020-10-27 PROCEDURE — G8427 DOCREV CUR MEDS BY ELIG CLIN: HCPCS | Performed by: INTERNAL MEDICINE

## 2020-10-27 PROCEDURE — 4040F PNEUMOC VAC/ADMIN/RCVD: CPT | Performed by: INTERNAL MEDICINE

## 2020-10-27 RX ORDER — UMECLIDINIUM 62.5 UG/1
1 AEROSOL, POWDER ORAL DAILY
Qty: 1 EACH | Refills: 11 | Status: SHIPPED | OUTPATIENT
Start: 2020-10-27 | End: 2021-11-02 | Stop reason: SDUPTHER

## 2020-10-27 NOTE — PROGRESS NOTES
[facility]  Progress Note  James Kern MD    Pulmonology Video Visit    Pursuant to the emergency declaration under the [facility] waiver authority and the Coronavirus Preparedness and Response Supplemental Appropriations Act this Telephone/Video Visit was insisted, with patient's consent, to reduce the patient's risk of exposure to COVID-19 and provide continuity of care for an established patient. The patient was at home, while the provider was at the clinic. Services were provided through a synchronous discussion over a telephone and/or Video chat to substitute for in-person clinic visit, and coded as such. Stefan Rajan   YOB: 1950    Date of Visit:  10/27/2020    Assessment/Plan:  1. COPD, moderate ([address]  PFT 2017:  INTERPRETATION: Spirometry reveals decreased FVC at 2.18 liters, which is 69%  of predicted. FEV1 is decreased at 1.51 liters, which is 62% of predicted. FEV1/FVC ratio is reduced. There is significant improvement in FEV1 after  inhaled bronchodilators. Lung volumes reveal normal total lung capacity and  vital capacity. Residual volume is at the upper limits of normal. Diffusion capacity is normal. In comparison to a study from September 2012, FVC has decreased by 13%, FEV1 has decreased by 20% and residual volume has increased  by 45%.     IMPRESSION: Moderate obstructive lung disease. In comparison to previous  study, air flow has worsened and there is a trend towards air trapping. Dulera  Incruse  Albuterol    Does not have access to her water aerobics class at the moment but is trying to walk in the park    2. SOB (shortness of breath)  Exercise has helped    3. Essential hypertension  BP Is reported to be good    4. Immunization  PCV 13/23 and Flu shot are UTD      FOLLOW UP: 6 months      Chief Complaint   Patient presents with    Shortness of Breath     6 month f.u. HPI  The patient presents with a chief complaint of moderate shortness of breath related to mild COPD of many years duration. He has mild associated cough. Exertion is a modifying factor. She had been doing water aerobics and benefited from that. However, during the coronavirus pandemic, this has been stopped. She is missing it. However, she does go for walks in the park near her house to try to keep her exercise habit intact. There is no complaint of chest pain, nausea or vomiting. She has not had any recent flareups        Review of Systems  As documented in HPI     Physical Exam:   Video visit    Allergies   Allergen Reactions    Norvasc [Amlodipine Besylate]      Prior to Visit Medications    Medication Sig Taking? Authorizing Provider   Umeclidinium Bromide (INCRUSE ELLIPTA) 62.5 MCG/INH AEPB Inhale 1 puff into the lungs daily  MD lisseth Calderonetasone-formoterol [facility]) 200-5 MCG/ACT inhaler Inhale 2 puffs into the lungs 2 times daily  Mata Nichols MD   albuterol sulfate  (90 Base) MCG/ACT inhaler Inhale 2 puffs into the lungs every 6 hours as needed for Wheezing  MD lisseth Calderonetasone-formoterol (DULERA) 200-5 MCG/ACT inhaler INHALE 2 PUFFS INTO THE LUNGS 2 TIMES DAILY. MD eliceo Calderonsone-formoterol [facility]) 200-5 MCG/ACT inhaler 2 puffs twice a day (12 hrs apart)  Mata Nichols MD   atorvastatin (LIPITOR) 20 MG tablet Take 1 tablet by mouth daily  Historical Provider, MD   metoprolol tartrate (LOPRESSOR) 25 MG tablet Take 1 tablet by mouth 2 times daily  Historical Provider, MD   Calcium Carbonate-Vitamin D (CALCIUM + D PO) Take  by mouth. Historical Provider, MD   Naproxen Sodium (ALEVE) 220 MG CAPS Take 1 tablet by mouth daily. Historical Provider, MD   KLOR-CON M20 20 MEQ tablet Take 1 tablet by mouth 2 times daily. Historical Provider, MD   hydrochlorothiazide (HYDRODIURIL) 25 MG tablet Take 1 tablet by mouth daily.   Historical Provider, MD   LEXAPRO 20 MG tablet Take 1 tablet by mouth daily. Historical Provider, MD   doxazosin (CARDURA) 8 MG tablet Take 1 tablet by mouth daily. Historical Provider, MD       There were no vitals filed for this visit. There is no height or weight on file to calculate BMI.      Wt Readings from Last 3 Encounters:   04/17/19 163 lb (73.9 kg)   08/29/18 167 lb (75.8 kg)   04/09/18 167 lb (75.8 kg)     BP Readings from Last 3 Encounters:   10/25/19 126/74   04/17/19 127/85   10/16/18 118/74        Social History     Tobacco Use   Smoking Status Never Smoker   Smokeless Tobacco Never Used

## 2021-03-01 ENCOUNTER — IMMUNIZATION (OUTPATIENT)
Dept: PRIMARY CARE CLINIC | Age: 71
End: 2021-03-01
Payer: MEDICARE

## 2021-03-01 PROCEDURE — 91301 COVID-19, MODERNA VACCINE 100MCG/0.5ML DOSE: CPT | Performed by: FAMILY MEDICINE

## 2021-03-01 PROCEDURE — 0011A PR IMM ADMN SARSCOV2 100 MCG/0.5 ML 1ST DOSE: CPT | Performed by: FAMILY MEDICINE

## 2021-03-29 ENCOUNTER — IMMUNIZATION (OUTPATIENT)
Dept: PRIMARY CARE CLINIC | Age: 71
End: 2021-03-29
Payer: MEDICARE

## 2021-03-29 PROCEDURE — 0012A COVID-19, MODERNA VACCINE 100MCG/0.5ML DOSE: CPT | Performed by: FAMILY MEDICINE

## 2021-03-29 PROCEDURE — 91301 COVID-19, MODERNA VACCINE 100MCG/0.5ML DOSE: CPT | Performed by: FAMILY MEDICINE

## 2021-05-05 ENCOUNTER — OFFICE VISIT (OUTPATIENT)
Dept: PULMONOLOGY | Age: 71
End: 2021-05-05
Payer: MEDICARE

## 2021-05-05 ENCOUNTER — HOSPITAL ENCOUNTER (OUTPATIENT)
Dept: GENERAL RADIOLOGY | Age: 71
Discharge: HOME | End: 2021-05-05
Payer: MEDICARE

## 2021-05-05 ENCOUNTER — HOSPITAL ENCOUNTER (OUTPATIENT)
Age: 71
Discharge: HOME | End: 2021-05-05
Payer: MEDICARE

## 2021-05-05 VITALS
OXYGEN SATURATION: 96 % | DIASTOLIC BLOOD PRESSURE: 74 MMHG | TEMPERATURE: 97.7 F | SYSTOLIC BLOOD PRESSURE: 137 MMHG | HEART RATE: 84 BPM

## 2021-05-05 DIAGNOSIS — R06.02 SOB (SHORTNESS OF BREATH): ICD-10-CM

## 2021-05-05 DIAGNOSIS — J44.9 COPD, MODERATE (HCC): Primary | ICD-10-CM

## 2021-05-05 DIAGNOSIS — J44.9 COPD, MODERATE (HCC): ICD-10-CM

## 2021-05-05 DIAGNOSIS — I10 ESSENTIAL HYPERTENSION: ICD-10-CM

## 2021-05-05 PROCEDURE — 1090F PRES/ABSN URINE INCON ASSESS: CPT | Performed by: INTERNAL MEDICINE

## 2021-05-05 PROCEDURE — 3023F SPIROM DOC REV: CPT | Performed by: INTERNAL MEDICINE

## 2021-05-05 PROCEDURE — G8421 BMI NOT CALCULATED: HCPCS | Performed by: INTERNAL MEDICINE

## 2021-05-05 PROCEDURE — G8427 DOCREV CUR MEDS BY ELIG CLIN: HCPCS | Performed by: INTERNAL MEDICINE

## 2021-05-05 PROCEDURE — 3017F COLORECTAL CA SCREEN DOC REV: CPT | Performed by: INTERNAL MEDICINE

## 2021-05-05 PROCEDURE — 4040F PNEUMOC VAC/ADMIN/RCVD: CPT | Performed by: INTERNAL MEDICINE

## 2021-05-05 PROCEDURE — 71046 X-RAY EXAM CHEST 2 VIEWS: CPT

## 2021-05-05 PROCEDURE — 99213 OFFICE O/P EST LOW 20 MIN: CPT | Performed by: INTERNAL MEDICINE

## 2021-05-05 PROCEDURE — G8926 SPIRO NO PERF OR DOC: HCPCS | Performed by: INTERNAL MEDICINE

## 2021-05-05 PROCEDURE — 1036F TOBACCO NON-USER: CPT | Performed by: INTERNAL MEDICINE

## 2021-05-05 PROCEDURE — G8400 PT W/DXA NO RESULTS DOC: HCPCS | Performed by: INTERNAL MEDICINE

## 2021-05-05 PROCEDURE — 1123F ACP DISCUSS/DSCN MKR DOCD: CPT | Performed by: INTERNAL MEDICINE

## 2021-05-05 RX ORDER — DULOXETIN HYDROCHLORIDE 60 MG/1
60 CAPSULE, DELAYED RELEASE ORAL DAILY
COMMUNITY

## 2021-05-05 RX ORDER — ARIPIPRAZOLE 10 MG/1
10 TABLET ORAL DAILY
COMMUNITY

## 2021-05-05 NOTE — PROGRESS NOTES
[facility] [place]  Progress Note  MD Breanne Garcia   YOB: 1950    Date of Visit:  5/5/2021    Assessment/Plan:  1. COPD, moderate ([facility] [address]  PFT 2017:  INTERPRETATION: Spirometry reveals decreased FVC at 2.18 liters, which is 69%  of predicted. FEV1 is decreased at 1.51 liters, which is 62% of predicted. FEV1/FVC ratio is reduced. There is significant improvement in FEV1 after  inhaled bronchodilators. Lung volumes reveal normal total lung capacity and  vital capacity. Residual volume is at the upper limits of normal. Diffusion capacity is normal. In comparison to a study from September 2012, FVC has decreased by 13%, FEV1 has decreased by 20% and residual volume has increased  by 45%.     IMPRESSION: Moderate obstructive lung disease. In comparison to previous  study, air flow has worsened and there is a trend towards air trapping. Dulera  Incruse  Albuterol    Appears ill but her chest exam is normal.  I sent her for chest x-ray and this is also clear  We will notify Dr. Bobby Maxwell. I asked the patient to make an appointment to see her as soon as she can. 2. SOB (shortness of breath)  Exercise has helped    3. Essential hypertension  BP Is reported to be good    4. Immunization  PCV 13/23 and Flu shot are UTD      FOLLOW UP: 6 months      Chief Complaint   Patient presents with    Fatigue     always tired Sleeping 12-15 hours a day and still feels exhausted. Doesn't feel safe driving.  Discuss Medications     Incruse is 300.00 but not sure if this is due to deductable or tier so she is going to check and get back to us.  Shortness of Breath     did get her COVID vaccines       HPI  The patient presents with a chief complaint of moderate shortness of breath related to mild COPD of many years duration. He has mild associated cough. Exertion is a modifying factor. She has been having trouble with fatigue.   Says she can sleep 16 to 18 hours/day. She looks a bit disheveled and pale on presentation today. Review of Systems  No complaint of chest pain, nausea or vomiting    Physical Exam:  Well developed, well nourished  Alert and oriented  Sclera is clear  No cervical adenopathy  No JVD. Chest examination is clear. Cardiac examination reveals regular rate and rhythm without murmur, gallop or rub. The abdomen is soft, nontender and nondistended. There is no clubbing, cyanosis or edema of the extremities. There is no obvious skin rash. No focal neuro deficicts  Normal mood and affect      Allergies   Allergen Reactions    Norvasc [Amlodipine Besylate]      Prior to Visit Medications    Medication Sig Taking? Authorizing Provider   mometasone-formoterol (DULERA) 200-5 MCG/ACT inhaler 2 puffs twice a day (12 hrs apart) Yes Jayleen Levin MD   atorvastatin (LIPITOR) 20 MG tablet Take 1 tablet by mouth daily Yes Historical Provider, MD   Calcium Carbonate-Vitamin D (CALCIUM + D PO) Take  by mouth. Yes Historical Provider, MD   KLOR-CON M20 20 MEQ tablet Take 1 tablet by mouth 2 times daily. Yes Historical Provider, MD   hydrochlorothiazide (HYDRODIURIL) 25 MG tablet Take 1 tablet by mouth daily. Yes Historical Provider, MD   Umeclidinium Bromide (INCRUSE ELLIPTA) 62.5 MCG/INH AEPB Inhale 1 puff into the lungs daily  Jayleen Levin MD   Umeclidinium Bromide (INCRUSE ELLIPTA) 62.5 MCG/INH AEPB Inhale 1 puff into the lungs daily  Jayleen Levin MD   albuterol sulfate  (90 Base) MCG/ACT inhaler Inhale 2 puffs into the lungs every 6 hours as needed for Wheezing  Jayleen Levin MD   Naproxen Sodium (ALEVE) 220 MG CAPS Take 1 tablet by mouth daily. Historical Provider, MD       Vitals:    05/05/21 1143   BP: 137/74   Pulse: 84   Temp: 97.7 °F (36.5 °C)   TempSrc: Temporal   SpO2: 96%     There is no height or weight on file to calculate BMI.      Wt Readings from Last 3 Encounters:   04/17/19 163 lb (73.9 kg)   08/29/18 167 lb (75.8 kg)   04/09/18 167 lb (75.8 kg)     BP Readings from Last 3 Encounters:   05/05/21 137/74   10/25/19 126/74   04/17/19 127/85        Social History     Tobacco Use   Smoking Status Never Smoker   Smokeless Tobacco Never Used

## 2021-11-02 ENCOUNTER — OFFICE VISIT (OUTPATIENT)
Dept: PULMONOLOGY | Age: 71
End: 2021-11-02
Payer: MEDICARE

## 2021-11-02 VITALS — HEART RATE: 84 BPM | OXYGEN SATURATION: 96 %

## 2021-11-02 DIAGNOSIS — I10 PRIMARY HYPERTENSION: ICD-10-CM

## 2021-11-02 DIAGNOSIS — R06.02 SOB (SHORTNESS OF BREATH): ICD-10-CM

## 2021-11-02 DIAGNOSIS — J44.9 COPD, MODERATE (HCC): Primary | ICD-10-CM

## 2021-11-02 PROCEDURE — G8400 PT W/DXA NO RESULTS DOC: HCPCS | Performed by: INTERNAL MEDICINE

## 2021-11-02 PROCEDURE — G8926 SPIRO NO PERF OR DOC: HCPCS | Performed by: INTERNAL MEDICINE

## 2021-11-02 PROCEDURE — 1123F ACP DISCUSS/DSCN MKR DOCD: CPT | Performed by: INTERNAL MEDICINE

## 2021-11-02 PROCEDURE — G8484 FLU IMMUNIZE NO ADMIN: HCPCS | Performed by: INTERNAL MEDICINE

## 2021-11-02 PROCEDURE — 3017F COLORECTAL CA SCREEN DOC REV: CPT | Performed by: INTERNAL MEDICINE

## 2021-11-02 PROCEDURE — 4040F PNEUMOC VAC/ADMIN/RCVD: CPT | Performed by: INTERNAL MEDICINE

## 2021-11-02 PROCEDURE — G8421 BMI NOT CALCULATED: HCPCS | Performed by: INTERNAL MEDICINE

## 2021-11-02 PROCEDURE — 1036F TOBACCO NON-USER: CPT | Performed by: INTERNAL MEDICINE

## 2021-11-02 PROCEDURE — 1090F PRES/ABSN URINE INCON ASSESS: CPT | Performed by: INTERNAL MEDICINE

## 2021-11-02 PROCEDURE — G8427 DOCREV CUR MEDS BY ELIG CLIN: HCPCS | Performed by: INTERNAL MEDICINE

## 2021-11-02 PROCEDURE — 3023F SPIROM DOC REV: CPT | Performed by: INTERNAL MEDICINE

## 2021-11-02 PROCEDURE — 99213 OFFICE O/P EST LOW 20 MIN: CPT | Performed by: INTERNAL MEDICINE

## 2021-11-02 RX ORDER — UMECLIDINIUM 62.5 UG/1
1 AEROSOL, POWDER ORAL DAILY
Qty: 1 EACH | Refills: 11 | Status: SHIPPED | OUTPATIENT
Start: 2021-11-02

## 2021-11-02 NOTE — PROGRESS NOTES
[facility]  Progress Note  Samy Velasco MD    Raeann Ellis   YOB: 1950    Date of Visit:  11/2/2021    Assessment/Plan:  1. COPD, moderate ([address]  PFT 2017:  INTERPRETATION: Spirometry reveals decreased FVC at 2.18 liters, which is 69%  of predicted. FEV1 is decreased at 1.51 liters, which is 62% of predicted. FEV1/FVC ratio is reduced. There is significant improvement in FEV1 after  inhaled bronchodilators. Lung volumes reveal normal total lung capacity and  vital capacity. Residual volume is at the upper limits of normal. Diffusion capacity is normal. In comparison to a study from September 2012, FVC has decreased by 13%, FEV1 has decreased by 20% and residual volume has increased  by 45%.     IMPRESSION: Moderate obstructive lung disease. In comparison to previous  study, air flow has worsened and there is a trend towards air trapping. Dulera  Incruse  Albuterol    2. SOB (shortness of breath)  Exercise has helped    3. Essential hypertension  BP Is reported to be good    4. Immunization  PCV 13/23 and Flu shot are UTD  COVID UTD, needs booster. FOLLOW UP: 6 months      Chief Complaint   Patient presents with    Shortness of Breath     6 month       HPI  The patient presents with a chief complaint of moderate shortness of breath related to mild COPD of many years duration. He has mild associated cough. Exertion is a modifying factor. She is feeling some better compared to the last time she was here. Review of Systems  No complaint of chest pain, nausea or vomiting    Physical Exam:  Well developed, well nourished  Alert and oriented  Sclera is clear  No cervical adenopathy  No JVD. Chest examination is clear. Cardiac examination reveals regular rate and rhythm without murmur, gallop or rub. The abdomen is soft, nontender and nondistended. There is no clubbing, cyanosis or edema of the extremities.   There is no obvious skin rash.  No focal neuro deficicts  Normal mood and affect      Allergies   Allergen Reactions    Norvasc [Amlodipine Besylate]      Prior to Visit Medications    Medication Sig Taking? Authorizing Provider   DULoxetine (CYMBALTA) 60 MG extended release capsule Take 60 mg by mouth daily  Historical Provider, MD   ARIPiprazole (ABILIFY) 10 MG tablet Take 10 mg by mouth daily  Historical Provider, MD   Umeclidinium Bromide (INCRUSE ELLIPTA) 62.5 MCG/INH AEPB Inhale 1 puff into the lungs daily  Marquis Moris MD   albuterol sulfate  (90 Base) MCG/ACT inhaler Inhale 2 puffs into the lungs every 6 hours as needed for Wheezing  Marquis Moris MD   mometasone-formoterol (DULERA) 200-5 MCG/ACT inhaler 2 puffs twice a day (12 hrs apart)  Marquis Moris MD   atorvastatin (LIPITOR) 20 MG tablet Take 1 tablet by mouth daily  Historical Provider, MD   Calcium Carbonate-Vitamin D (CALCIUM + D PO) Take  by mouth. Historical Provider, MD   Naproxen Sodium (ALEVE) 220 MG CAPS Take 1 tablet by mouth daily. Historical Provider, MD   KLOR-CON M20 20 MEQ tablet Take 1 tablet by mouth 2 times daily. Historical Provider, MD   hydrochlorothiazide (HYDRODIURIL) 25 MG tablet Take 1 tablet by mouth daily. Historical Provider, MD       Vitals:    11/02/21 1142   Pulse: 84   SpO2: 96%     There is no height or weight on file to calculate BMI.      Wt Readings from Last 3 Encounters:   04/17/19 163 lb (73.9 kg)   08/29/18 167 lb (75.8 kg)   04/09/18 167 lb (75.8 kg)     BP Readings from Last 3 Encounters:   05/05/21 137/74   10/25/19 126/74   04/17/19 127/85        Social History     Tobacco Use   Smoking Status Never Smoker   Smokeless Tobacco Never Used

## 2022-05-16 ENCOUNTER — OFFICE VISIT (OUTPATIENT)
Dept: PULMONOLOGY | Age: 72
End: 2022-05-16
Payer: MEDICARE

## 2022-05-16 VITALS — OXYGEN SATURATION: 96 % | HEART RATE: 78 BPM

## 2022-05-16 DIAGNOSIS — J44.9 COPD, MODERATE (HCC): Primary | ICD-10-CM

## 2022-05-16 DIAGNOSIS — R06.02 SOB (SHORTNESS OF BREATH): ICD-10-CM

## 2022-05-16 PROCEDURE — 1123F ACP DISCUSS/DSCN MKR DOCD: CPT | Performed by: INTERNAL MEDICINE

## 2022-05-16 PROCEDURE — 99213 OFFICE O/P EST LOW 20 MIN: CPT | Performed by: INTERNAL MEDICINE

## 2022-05-16 PROCEDURE — 4040F PNEUMOC VAC/ADMIN/RCVD: CPT | Performed by: INTERNAL MEDICINE

## 2022-05-16 PROCEDURE — 3017F COLORECTAL CA SCREEN DOC REV: CPT | Performed by: INTERNAL MEDICINE

## 2022-05-16 PROCEDURE — G8421 BMI NOT CALCULATED: HCPCS | Performed by: INTERNAL MEDICINE

## 2022-05-16 PROCEDURE — 1090F PRES/ABSN URINE INCON ASSESS: CPT | Performed by: INTERNAL MEDICINE

## 2022-05-16 PROCEDURE — G8400 PT W/DXA NO RESULTS DOC: HCPCS | Performed by: INTERNAL MEDICINE

## 2022-05-16 PROCEDURE — G8427 DOCREV CUR MEDS BY ELIG CLIN: HCPCS | Performed by: INTERNAL MEDICINE

## 2022-05-16 PROCEDURE — 3023F SPIROM DOC REV: CPT | Performed by: INTERNAL MEDICINE

## 2022-05-16 PROCEDURE — 1036F TOBACCO NON-USER: CPT | Performed by: INTERNAL MEDICINE

## 2022-05-16 NOTE — PROGRESS NOTES
[facility]  Progress Note  Pamela Gr MD    Pari Cleary   YOB: 1950    Date of Visit:  5/16/2022    Assessment/Plan:  1. COPD, moderate ([address]  PFT 2017:  INTERPRETATION: Spirometry reveals decreased FVC at 2.18 liters, which is 69%  of predicted. FEV1 is decreased at 1.51 liters, which is 62% of predicted. FEV1/FVC ratio is reduced. There is significant improvement in FEV1 after  inhaled bronchodilators. Lung volumes reveal normal total lung capacity and  vital capacity. Residual volume is at the upper limits of normal. Diffusion capacity is normal. In comparison to a study from September 2012, FVC has decreased by 13%, FEV1 has decreased by 20% and residual volume has increased  by 45%.     IMPRESSION: Moderate obstructive lung disease. In comparison to previous  study, air flow has worsened and there is a trend towards air trapping. Dulera  Incruse  Albuterol    2. SOB (shortness of breath)  Exercise has helped    3. Essential hypertension  BP Is reported to be good    4. Immunization  PCV 13/23 and Flu shot are UTD  COVID UTD, needs booster. FOLLOW UP: 12 months      Chief Complaint   Patient presents with    Shortness of Breath     6 month       HPI  The patient presents with a chief complaint of moderate shortness of breath related to mild COPD of many years duration. He has mild associated cough. Exertion is a modifying factor. She is feeling some better compared to the last time she was here. Review of Systems  No complaint of chest pain, nausea or vomiting    Physical Exam:  Well developed, well nourished  Alert and oriented  Sclera is clear  No cervical adenopathy  No JVD. Chest examination is clear. Cardiac examination reveals regular rate and rhythm without murmur, gallop or rub. The abdomen is soft, nontender and nondistended. There is no clubbing, cyanosis or edema of the extremities.   There is no obvious skin rash.  No focal neuro deficicts  Normal mood and affect      Allergies   Allergen Reactions    Norvasc [Amlodipine Besylate]      Prior to Visit Medications    Medication Sig Taking? Authorizing Provider   mometasone-formoterol (DULERA) 200-5 MCG/ACT inhaler 2 puffs twice a day (12 hrs apart)  Susan Meyers MD   Umeclidinium Bromide (INCRUSE ELLIPTA) 62.5 MCG/INH AEPB Inhale 1 puff into the lungs daily  Susna Meyers MD   DULoxetine (CYMBALTA) 60 MG extended release capsule Take 60 mg by mouth daily  Historical Provider, MD   ARIPiprazole (ABILIFY) 10 MG tablet Take 10 mg by mouth daily  Historical Provider, MD   albuterol sulfate  (90 Base) MCG/ACT inhaler Inhale 2 puffs into the lungs every 6 hours as needed for Wheezing  Susan Meyers MD   atorvastatin (LIPITOR) 20 MG tablet Take 1 tablet by mouth daily  Historical Provider, MD   Calcium Carbonate-Vitamin D (CALCIUM + D PO) Take  by mouth. Historical Provider, MD   Naproxen Sodium (ALEVE) 220 MG CAPS Take 1 tablet by mouth daily. Historical Provider, MD   KLOR-CON M20 20 MEQ tablet Take 1 tablet by mouth 2 times daily. Historical Provider, MD   hydrochlorothiazide (HYDRODIURIL) 25 MG tablet Take 1 tablet by mouth daily. Historical Provider, MD       Vitals:    05/16/22 1313   Pulse: 78   SpO2: 96%     There is no height or weight on file to calculate BMI.      Wt Readings from Last 3 Encounters:   04/17/19 163 lb (73.9 kg)   08/29/18 167 lb (75.8 kg)   04/09/18 167 lb (75.8 kg)     BP Readings from Last 3 Encounters:   05/05/21 137/74   10/25/19 126/74   04/17/19 127/85        Social History     Tobacco Use   Smoking Status Never Smoker   Smokeless Tobacco Never Used

## 2022-07-11 NOTE — TELEPHONE ENCOUNTER
[address], [address] [facility]  Phone: [phone]   Fax: [phone]    Progress Note    Name: Azul Palacio   : 1935   MD: Osiel Calhoun referral.             Samira Benítez-Laura, PT 2022 Called pt to see what she is having done and this is for her hip-plastic cup is wore out from a hip replacement >20 years ago and they are going in to replace this.

## 2022-10-28 ENCOUNTER — TELEPHONE (OUTPATIENT)
Dept: PULMONOLOGY | Age: 72
End: 2022-10-28

## 2022-10-28 NOTE — TELEPHONE ENCOUNTER
Patient called and needs a refill on medication. Patient also wanted to know recommendations on getting the 4th covid booster shot. Umeclidinium Bromide (INCRUSE ELLIPTA) 62.5 MCG/INH AEPB     [id number]- [place], [place] - [facility] [address].  Jannie Rolon [phone] [place]maña [phone]     PH: [phone]

## 2024-06-10 RX ORDER — UMECLIDINIUM 62.5 UG/1
AEROSOL, POWDER ORAL
Qty: 3 EACH | Refills: 3 | Status: SHIPPED | OUTPATIENT
Start: 2024-06-10

## 2024-06-17 ENCOUNTER — OFFICE VISIT (OUTPATIENT)
Dept: PULMONOLOGY | Age: 74
End: 2024-06-17
Payer: MEDICARE

## 2024-06-17 VITALS
HEART RATE: 101 BPM | WEIGHT: 164.8 LBS | OXYGEN SATURATION: 96 % | BODY MASS INDEX: 28.13 KG/M2 | DIASTOLIC BLOOD PRESSURE: 64 MMHG | HEIGHT: 64 IN | SYSTOLIC BLOOD PRESSURE: 110 MMHG

## 2024-06-17 DIAGNOSIS — J44.9 COPD, MODERATE (HCC): Primary | ICD-10-CM

## 2024-06-17 PROCEDURE — G8400 PT W/DXA NO RESULTS DOC: HCPCS | Performed by: INTERNAL MEDICINE

## 2024-06-17 PROCEDURE — G8427 DOCREV CUR MEDS BY ELIG CLIN: HCPCS | Performed by: INTERNAL MEDICINE

## 2024-06-17 PROCEDURE — 99213 OFFICE O/P EST LOW 20 MIN: CPT | Performed by: INTERNAL MEDICINE

## 2024-06-17 PROCEDURE — 1090F PRES/ABSN URINE INCON ASSESS: CPT | Performed by: INTERNAL MEDICINE

## 2024-06-17 PROCEDURE — 3074F SYST BP LT 130 MM HG: CPT | Performed by: INTERNAL MEDICINE

## 2024-06-17 PROCEDURE — 1036F TOBACCO NON-USER: CPT | Performed by: INTERNAL MEDICINE

## 2024-06-17 PROCEDURE — G8419 CALC BMI OUT NRM PARAM NOF/U: HCPCS | Performed by: INTERNAL MEDICINE

## 2024-06-17 PROCEDURE — 3023F SPIROM DOC REV: CPT | Performed by: INTERNAL MEDICINE

## 2024-06-17 PROCEDURE — 3017F COLORECTAL CA SCREEN DOC REV: CPT | Performed by: INTERNAL MEDICINE

## 2024-06-17 PROCEDURE — 1123F ACP DISCUSS/DSCN MKR DOCD: CPT | Performed by: INTERNAL MEDICINE

## 2024-06-17 PROCEDURE — 3078F DIAST BP <80 MM HG: CPT | Performed by: INTERNAL MEDICINE

## 2024-06-17 RX ORDER — VALSARTAN 80 MG/1
80 TABLET ORAL DAILY
COMMUNITY

## 2024-06-17 NOTE — PROGRESS NOTES
obvious skin rash.  No focal neuro deficicts  Normal mood and affect      Allergies   Allergen Reactions    Norvasc [Amlodipine Besylate]      Prior to Visit Medications    Medication Sig Taking? Authorizing Provider   valsartan (DIOVAN) 80 MG tablet Take 1 tablet by mouth daily Yes Alexandria Rosado MD   INCKAITLYNN ELLIPTA 62.5 MCG/ACT inhaler USE 1 INHALATION DAILY  Laura Crowell MD   DULoxetine (CYMBALTA) 60 MG extended release capsule Take 60 mg by mouth daily  Alexandria Rosado MD   ARIPiprazole (ABILIFY) 10 MG tablet Take 10 mg by mouth daily  Alexandria Rosado MD   albuterol sulfate  (90 Base) MCG/ACT inhaler Inhale 2 puffs into the lungs every 6 hours as needed for Wheezing  Solomon Adam MD   atorvastatin (LIPITOR) 20 MG tablet Take 1 tablet by mouth daily  Alexandria Rosado MD   Calcium Carbonate-Vitamin D (CALCIUM + D PO) Take  by mouth.  Alexandria Rosado MD   Naproxen Sodium (ALEVE) 220 MG CAPS Take 1 tablet by mouth daily.  Alexandria Rosado MD   KLOR-CON M20 20 MEQ tablet Take 1 tablet by mouth 2 times daily.  Alexandria Rosado MD   hydrochlorothiazide (HYDRODIURIL) 25 MG tablet Take 1 tablet by mouth daily.  Alexandria Rosado MD       Vitals:    06/17/24 1305   BP: 110/64   Site: Left Upper Arm   Position: Sitting   Cuff Size: Medium Adult   Pulse: (!) 101   SpO2: 96%   Weight: 74.8 kg (164 lb 12.8 oz)   Height: 1.626 m (5' 4\")     Body mass index is 28.29 kg/m².     Wt Readings from Last 3 Encounters:   06/17/24 74.8 kg (164 lb 12.8 oz)   04/17/19 73.9 kg (163 lb)   08/29/18 75.8 kg (167 lb)     BP Readings from Last 3 Encounters:   06/17/24 110/64   05/05/21 137/74   10/25/19 126/74        Social History     Tobacco Use   Smoking Status Never   Smokeless Tobacco Never

## 2025-06-17 ENCOUNTER — OFFICE VISIT (OUTPATIENT)
Dept: PULMONOLOGY | Age: 75
End: 2025-06-17
Payer: MEDICARE

## 2025-06-17 VITALS
OXYGEN SATURATION: 97 % | HEIGHT: 64 IN | WEIGHT: 163.4 LBS | DIASTOLIC BLOOD PRESSURE: 80 MMHG | HEART RATE: 89 BPM | BODY MASS INDEX: 27.9 KG/M2 | SYSTOLIC BLOOD PRESSURE: 130 MMHG

## 2025-06-17 DIAGNOSIS — J44.9 COPD, MODERATE (HCC): Primary | ICD-10-CM

## 2025-06-17 PROCEDURE — G8427 DOCREV CUR MEDS BY ELIG CLIN: HCPCS | Performed by: INTERNAL MEDICINE

## 2025-06-17 PROCEDURE — 1159F MED LIST DOCD IN RCRD: CPT | Performed by: INTERNAL MEDICINE

## 2025-06-17 PROCEDURE — 3017F COLORECTAL CA SCREEN DOC REV: CPT | Performed by: INTERNAL MEDICINE

## 2025-06-17 PROCEDURE — 3075F SYST BP GE 130 - 139MM HG: CPT | Performed by: INTERNAL MEDICINE

## 2025-06-17 PROCEDURE — 99213 OFFICE O/P EST LOW 20 MIN: CPT | Performed by: INTERNAL MEDICINE

## 2025-06-17 PROCEDURE — 1123F ACP DISCUSS/DSCN MKR DOCD: CPT | Performed by: INTERNAL MEDICINE

## 2025-06-17 PROCEDURE — G8400 PT W/DXA NO RESULTS DOC: HCPCS | Performed by: INTERNAL MEDICINE

## 2025-06-17 PROCEDURE — 1160F RVW MEDS BY RX/DR IN RCRD: CPT | Performed by: INTERNAL MEDICINE

## 2025-06-17 PROCEDURE — 3023F SPIROM DOC REV: CPT | Performed by: INTERNAL MEDICINE

## 2025-06-17 PROCEDURE — 3079F DIAST BP 80-89 MM HG: CPT | Performed by: INTERNAL MEDICINE

## 2025-06-17 PROCEDURE — G8419 CALC BMI OUT NRM PARAM NOF/U: HCPCS | Performed by: INTERNAL MEDICINE

## 2025-06-17 PROCEDURE — 1036F TOBACCO NON-USER: CPT | Performed by: INTERNAL MEDICINE

## 2025-06-17 PROCEDURE — 1090F PRES/ABSN URINE INCON ASSESS: CPT | Performed by: INTERNAL MEDICINE

## 2025-06-17 RX ORDER — UMECLIDINIUM 62.5 UG/1
1 AEROSOL, POWDER ORAL DAILY
Qty: 3 EACH | Refills: 3 | Status: SHIPPED | OUTPATIENT
Start: 2025-06-17

## 2025-06-17 NOTE — PROGRESS NOTES
obvious skin rash.  No focal neuro deficicts  Normal mood and affect      Allergies   Allergen Reactions    Norvasc [Amlodipine Besylate]      Prior to Visit Medications    Medication Sig Taking? Authorizing Provider   valsartan (DIOVAN) 80 MG tablet Take 1 tablet by mouth daily Yes Provider, Historical, MD   INCRUSE ELLIPTA 62.5 MCG/ACT inhaler USE 1 INHALATION DAILY Yes Laura Crowell MD   DULoxetine (CYMBALTA) 60 MG extended release capsule Take 1 capsule by mouth daily Yes Alexandria Rosado MD   ARIPiprazole (ABILIFY) 10 MG tablet Take 1 tablet by mouth daily Yes Alexandria Rosado MD   atorvastatin (LIPITOR) 20 MG tablet Take 1 tablet by mouth daily Yes Alexandria Rosado MD   Calcium Carbonate-Vitamin D (CALCIUM + D PO) Take  by mouth. Yes Alexandria Rosado MD   Naproxen Sodium (ALEVE) 220 MG CAPS Take 1 tablet by mouth daily. Yes Alexandria Rosado MD KLOR-CON M20 20 MEQ tablet Take 1 tablet by mouth 2 times daily Yes Alexandria Rosado MD   hydrochlorothiazide (HYDRODIURIL) 25 MG tablet Take 1 tablet by mouth daily Yes Alexandria Rosado MD   albuterol sulfate  (90 Base) MCG/ACT inhaler Inhale 2 puffs into the lungs every 6 hours as needed for Wheezing  Solomon Adam MD       Vitals:    06/17/25 1303   BP: 130/80   BP Site: Left Upper Arm   Patient Position: Sitting   BP Cuff Size: Medium Adult   Pulse: 89   SpO2: 97%   Weight: 74.1 kg (163 lb 6.4 oz)   Height: 1.626 m (5' 4\")     Body mass index is 28.05 kg/m².     Wt Readings from Last 3 Encounters:   06/17/25 74.1 kg (163 lb 6.4 oz)   06/17/24 74.8 kg (164 lb 12.8 oz)   04/17/19 73.9 kg (163 lb)     BP Readings from Last 3 Encounters:   06/17/25 130/80   06/17/24 110/64   05/05/21 137/74        Social History     Tobacco Use   Smoking Status Never    Passive exposure: Never   Smokeless Tobacco Never